# Patient Record
(demographics unavailable — no encounter records)

---

## 2025-04-07 NOTE — REASON FOR VISIT
[Hyperlipidemia] : hyperlipidemia [Hypertension] : hypertension [Other: ____] : [unfilled] [FreeTextEntry1] : 83 years old female with seizure disorder was admitted in the hospital with exacerbation of CHF hfref-echocardiogram done revealed reduced ejection fraction of 43.% And patient was added Entresto.  And patient's losartan was discontinued.  Patient denies any chest pain no shortness of breath at rest at this time

## 2025-04-07 NOTE — ASSESSMENT
[FreeTextEntry1] : Patient's medications reviewed.  Patient will continue present medication no changes are made